# Patient Record
Sex: MALE | Race: WHITE | NOT HISPANIC OR LATINO | Employment: FULL TIME | URBAN - METROPOLITAN AREA
[De-identification: names, ages, dates, MRNs, and addresses within clinical notes are randomized per-mention and may not be internally consistent; named-entity substitution may affect disease eponyms.]

---

## 2018-12-22 ENCOUNTER — APPOINTMENT (OUTPATIENT)
Dept: GENERAL RADIOLOGY | Facility: CLINIC | Age: 43
End: 2018-12-22
Attending: INTERNAL MEDICINE
Payer: COMMERCIAL

## 2018-12-22 ENCOUNTER — APPOINTMENT (OUTPATIENT)
Dept: CT IMAGING | Facility: CLINIC | Age: 43
End: 2018-12-22
Attending: INTERNAL MEDICINE
Payer: COMMERCIAL

## 2018-12-22 ENCOUNTER — HOSPITAL ENCOUNTER (EMERGENCY)
Facility: CLINIC | Age: 43
Discharge: HOME | End: 2018-12-22
Attending: INTERNAL MEDICINE | Admitting: INTERNAL MEDICINE
Payer: COMMERCIAL

## 2018-12-22 VITALS
RESPIRATION RATE: 16 BRPM | TEMPERATURE: 97.8 F | DIASTOLIC BLOOD PRESSURE: 91 MMHG | BODY MASS INDEX: 39.2 KG/M2 | SYSTOLIC BLOOD PRESSURE: 132 MMHG | WEIGHT: 280 LBS | OXYGEN SATURATION: 95 % | HEART RATE: 77 BPM | HEIGHT: 71 IN

## 2018-12-22 DIAGNOSIS — R00.2 PALPITATIONS: ICD-10-CM

## 2018-12-22 DIAGNOSIS — R07.9 CHEST PAIN, UNSPECIFIED TYPE: ICD-10-CM

## 2018-12-22 LAB
ANION GAP SERPL CALCULATED.3IONS-SCNC: 7 MMOL/L (ref 3–14)
BASOPHILS # BLD AUTO: 0.1 10E9/L (ref 0–0.2)
BASOPHILS NFR BLD AUTO: 0.8 %
BUN SERPL-MCNC: 15 MG/DL (ref 7–30)
CALCIUM SERPL-MCNC: 8.6 MG/DL (ref 8.5–10.1)
CHLORIDE SERPL-SCNC: 101 MMOL/L (ref 94–109)
CO2 SERPL-SCNC: 27 MMOL/L (ref 20–32)
CREAT SERPL-MCNC: 0.68 MG/DL (ref 0.66–1.25)
D DIMER PPP FEU-MCNC: 0.3 UG/ML FEU (ref 0–0.5)
DIFFERENTIAL METHOD BLD: NORMAL
EOSINOPHIL # BLD AUTO: 0.3 10E9/L (ref 0–0.7)
EOSINOPHIL NFR BLD AUTO: 3.9 %
ERYTHROCYTE [DISTWIDTH] IN BLOOD BY AUTOMATED COUNT: 11.9 % (ref 10–15)
GFR SERPL CREATININE-BSD FRML MDRD: >90 ML/MIN/{1.73_M2}
GLUCOSE SERPL-MCNC: 240 MG/DL (ref 70–99)
HCT VFR BLD AUTO: 48.4 % (ref 40–53)
HGB BLD-MCNC: 17.1 G/DL (ref 13.3–17.7)
IMM GRANULOCYTES # BLD: 0 10E9/L (ref 0–0.4)
IMM GRANULOCYTES NFR BLD: 0.2 %
LYMPHOCYTES # BLD AUTO: 1.4 10E9/L (ref 0.8–5.3)
LYMPHOCYTES NFR BLD AUTO: 21.9 %
MCH RBC QN AUTO: 31.8 PG (ref 26.5–33)
MCHC RBC AUTO-ENTMCNC: 35.3 G/DL (ref 31.5–36.5)
MCV RBC AUTO: 90 FL (ref 78–100)
MONOCYTES # BLD AUTO: 0.5 10E9/L (ref 0–1.3)
MONOCYTES NFR BLD AUTO: 7.1 %
NEUTROPHILS # BLD AUTO: 4.2 10E9/L (ref 1.6–8.3)
NEUTROPHILS NFR BLD AUTO: 66.1 %
NRBC # BLD AUTO: 0 10*3/UL
NRBC BLD AUTO-RTO: 0 /100
PLATELET # BLD AUTO: 255 10E9/L (ref 150–450)
POTASSIUM SERPL-SCNC: 4.2 MMOL/L (ref 3.4–5.3)
RBC # BLD AUTO: 5.38 10E12/L (ref 4.4–5.9)
SODIUM SERPL-SCNC: 135 MMOL/L (ref 133–144)
TROPONIN I SERPL-MCNC: <0.015 UG/L (ref 0–0.04)
TSH SERPL DL<=0.005 MIU/L-ACNC: 2.36 MU/L (ref 0.4–4)
WBC # BLD AUTO: 6.4 10E9/L (ref 4–11)

## 2018-12-22 PROCEDURE — 25000128 H RX IP 250 OP 636: Performed by: INTERNAL MEDICINE

## 2018-12-22 PROCEDURE — 99285 EMERGENCY DEPT VISIT HI MDM: CPT | Mod: 25

## 2018-12-22 PROCEDURE — 71260 CT THORAX DX C+: CPT

## 2018-12-22 PROCEDURE — 93005 ELECTROCARDIOGRAM TRACING: CPT

## 2018-12-22 PROCEDURE — 71046 X-RAY EXAM CHEST 2 VIEWS: CPT

## 2018-12-22 PROCEDURE — 84484 ASSAY OF TROPONIN QUANT: CPT | Performed by: INTERNAL MEDICINE

## 2018-12-22 PROCEDURE — 85025 COMPLETE CBC W/AUTO DIFF WBC: CPT | Performed by: INTERNAL MEDICINE

## 2018-12-22 PROCEDURE — 84443 ASSAY THYROID STIM HORMONE: CPT | Performed by: INTERNAL MEDICINE

## 2018-12-22 PROCEDURE — 80048 BASIC METABOLIC PNL TOTAL CA: CPT | Performed by: INTERNAL MEDICINE

## 2018-12-22 PROCEDURE — 85379 FIBRIN DEGRADATION QUANT: CPT | Performed by: INTERNAL MEDICINE

## 2018-12-22 RX ORDER — OMEPRAZOLE 10 MG/1
20 CAPSULE, DELAYED RELEASE ORAL DAILY
COMMUNITY

## 2018-12-22 RX ORDER — IOPAMIDOL 755 MG/ML
500 INJECTION, SOLUTION INTRAVASCULAR ONCE
Status: COMPLETED | OUTPATIENT
Start: 2018-12-22 | End: 2018-12-22

## 2018-12-22 RX ORDER — LISINOPRIL 10 MG/1
10 TABLET ORAL DAILY
COMMUNITY

## 2018-12-22 RX ADMIN — SODIUM CHLORIDE 90 ML: 9 INJECTION, SOLUTION INTRAVENOUS at 18:31

## 2018-12-22 RX ADMIN — IOPAMIDOL 83 ML: 755 INJECTION, SOLUTION INTRAVENOUS at 18:31

## 2018-12-22 ASSESSMENT — ENCOUNTER SYMPTOMS
ABDOMINAL PAIN: 0
DYSURIA: 0
FREQUENCY: 0
PALPITATIONS: 1
COUGH: 1
DIZZINESS: 1
DIARRHEA: 0
VOMITING: 0
NAUSEA: 0
HEMATURIA: 0

## 2018-12-22 ASSESSMENT — MIFFLIN-ST. JEOR: SCORE: 2177.2

## 2018-12-22 NOTE — ED PROVIDER NOTES
History     Chief Complaint:  Chest pain     HPI   Rajesh Macario is a 43 year old male, with a history of diabetes and HTN, who presents with wife to the emergency department for evaluation of center chest pain. The patient reports he has been experiencing on and off chest pain for the past couple of weeks but worsened this morning lasting for a couple hours. He notes some palpitations of a racing heart rate on Monday for about 10-15 minutes. He notes he was coughing last night, due to his lisinopril, and noticed some groin pain. He reports some dizziness this morning which has since resolved. He denies any current groin pain or bulge, abdominal pain, nausea, vomiting, diarrhea, dysuria, frequency, or hematuria. He denies an recent immobilization. He denies a personal history of blood clots and denies a family history of blood clots.    Allergies:  No known drug allergies     Medications:    Excedrin migraine  Lotrisone  Lisinopril  Metformin  Prilosec    Past Medical History:    GERD  Type 2 diabetes    Past Surgical History:    History reviewed. No pertinent surgical history.    Family History:    History reviewed. No pertinent family history.     Social History:  The patient presents to the emergency department with his wife.  Marital Status:       Review of Systems   Respiratory: Positive for cough.    Cardiovascular: Positive for chest pain and palpitations.   Gastrointestinal: Negative for abdominal pain, diarrhea, nausea and vomiting.   Genitourinary: Negative for dysuria, frequency and hematuria.   Neurological: Positive for dizziness.   All other systems reviewed and are negative.    Physical Exam   Patient Vitals for the past 24 hrs:   BP Temp Temp src Pulse Heart Rate Resp SpO2 Height Weight   12/22/18 1745 128/60 -- -- 65 71 -- 96 % -- --   12/22/18 1730 127/84 -- -- 70 70 -- 94 % -- --   12/22/18 1715 125/86 -- -- 72 70 -- 96 % -- --   12/22/18 1700 -- -- -- -- 68 -- 93 % -- --  "  12/22/18 1630 -- -- -- -- 75 -- 96 % -- --   12/22/18 1615 -- -- -- 80 79 -- 95 % -- --   12/22/18 1600 -- -- -- 66 76 -- 96 % -- --   12/22/18 1545 -- -- -- 77 79 -- 95 % -- --   12/22/18 1537 (!) 134/102 97.8  F (36.6  C) Oral 80 80 20 96 % 1.803 m (5' 11\") 127 kg (280 lb)     Physical Exam   Constitutional:   Pleasant and cooperative   HENT:   Mouth/Throat: Oropharynx is clear and moist and mucous membranes are normal. No posterior oropharyngeal erythema.   Eyes: Conjunctivae are normal.   Neck: Neck supple.   Cardiovascular: Normal rate, regular rhythm and normal heart sounds.   Pulmonary/Chest: Effort normal and breath sounds normal.   Abdominal: Soft. Bowel sounds are normal. He exhibits no distension. There is no tenderness. There is no rebound and no guarding. Hernia confirmed negative in the right inguinal area.   Musculoskeletal: Normal range of motion.   Neurological: He is alert.   Skin: Skin is warm and dry.   Psychiatric: He has a normal mood and affect.     Emergency Department Course   ECG (15:37:33):  Rate 79 bpm. RI interval 134. QRS duration 94. QT/QTc 366/419. P-R-T axes 12 76 30. Normal sinus rhythm. Normal ECG.  Interpreted at 1615 by Amna Abdul MD.    Imaging:  Radiographic findings were communicated with the patient and family who voiced understanding of the findings.    Chest XR, PA & LAT  IMPRESSION: Left base atelectasis and/or infiltrate.  As read by Radiology.    Chest CT, IV contrast only - PE protocol  IMPRESSION:   1. No pulmonary embolism demonstrated.  2. No thoracic aortic dissection or aneurysm.   3. Large hiatal hernia containing essentially the entire stomach and a  large amount of intra-abdominal fat.  4. Probable compressive atelectasis secondary to the hiatal hernia in  the left lower lobe is less likely infiltrate.  As read by Radiology.    Laboratory:  CBC: AWNL (WBC 6.4, HGB 17.1, )  BMP: Glucose 240 (H) o/w WNL (Creatinine 0.68)    D dimer: " 0.3  Troponin I (1543): <0.015  TSH: 2.36    Emergency Department Course:  Past medical records, nursing notes, and vitals reviewed.  1604: I performed an exam of the patient and obtained history, as documented above.     IV inserted and blood drawn.    The patient was sent for a chest x-ray and a chest CT while in the emergency department, findings above.     1910: I rechecked the patient. Findings and plan explained to the Patient and spouse. Patient discharged home with instructions regarding supportive care, medications, and reasons to return. The importance of close follow-up was reviewed.   Impression & Plan    Medical Decision Making:    Rajesh Macario is a 43 year old male who presents the emergency department with chest pain and palpitations.  As noted his symptoms have been intermittent for weeks but then he had 2 hours of symptoms early this morning.  With prolonged symptoms over 8 hours ago I think a negative troponin rules out cardiac ischemia.  He has also had intermittent palpitations.  He has not had any arrhythmia here.  No significant electrolyte derangement.  I would like him to be on an event monitor to see if he has an intermittent arrhythmia.  I noted on exam he was sweaty and he indicates temperature intolerance.  TSH is normal.  On chest x-ray there was noted to be an abnormality at the left base.  This did not fit clinically with pneumonia so we pursued it with CT.  He does have a large hiatal hernia.  This may be related to his chest pain.    He also mentions groin pain.  I do not detect an inguinal hernia on exam.  Discussed signs of hernia and if he has any definite bulge or increased pain he needs to recheck.    I have discharged the patient with instructions to follow-up closely with primary care, return if worse or new symptoms.    Diagnosis:    ICD-10-CM    1. Chest pain, unspecified type R07.9    2. Palpitations R00.2 Cardiac Event Monitor Adult Pediatric      Disposition:  Discharged to home with instructions for follow up.  Ruth Martin  12/22/2018   [facility] EMERGENCY DEPARTMENT  Scribe Disclosure:  I, Ruth Martin, am serving as a scribe at 4:04 PM on 12/22/2018 to document services personally performed by Amna Abdul MD based on my observations and the provider's statements to me.      Amna Abdul MD  12/22/18 0750

## 2018-12-22 NOTE — ED TRIAGE NOTES
"Intermittent \"heart racing\" and \"some chest pain\" over last couple days. Today tightness in center of chest that radiates in left shoulder. Family hx of heart attacks. Pt has type II DM and hypertension- on lisinopril and metformin for about 6 months. Took 4 aspirin PTA. Presented to [facility] who sent him here with normal EKG. Pt says \"if I try to just relax then it seems to go away\" and wife says pt complains of some dizziness with the chest tightness.     Also complaint of pain in right side of groin when coughing.   ABCs intact. A&Ox3. VSS  "

## 2018-12-22 NOTE — ED AVS SNAPSHOT
[facility] Emergency Department  [address]  [facility] [address]  Phone:  [phone]  Fax:  [phone]                                    Rajesh Macario   MRN: [id number]    Department:  [facility] Emergency Department   Date of Visit:  12/22/2018           After Visit Summary Signature Page    I have received my discharge instructions, and my questions have been answered. I have discussed any challenges I see with this plan with the nurse or doctor.    ..........................................................................................................................................  Patient/Patient Representative Signature      ..........................................................................................................................................  Patient Representative Print Name and Relationship to Patient    ..................................................               ................................................  Date                                   Time    ..........................................................................................................................................  Reviewed by Signature/Title    ...................................................              ..............................................  Date                                               Time          22EPIC Rev 08/18

## 2018-12-23 LAB — INTERPRETATION ECG - MUSE: NORMAL

## 2018-12-23 NOTE — DISCHARGE INSTRUCTIONS
Discharge Instructions  Chest Pain    You have been seen today for chest pain or discomfort.  At this time, your doctor has found no signs that your chest pain is due to a serious or life-threatening condition, (or you have declined more testing and/or admission to the hospital). However, sometimes there is a serious problem that does not show up right away. Your evaluation today may not be complete and you may need further testing and evaluation.     You need to follow-up with your regular doctor within 3 days.    Return to the Emergency Department if:  Your chest pain changes, gets worse, starts to happen more often, or comes with less activity.  You are short of breath.  You get very weak or tired.  You pass out or faint.  You have any new symptoms, like fever, cough, numb legs, or you cough up blood.  You have anything else that worries you.    Until you follow-up with your regular doctor please do the following:  Take one aspirin daily unless you have an allergy or are told not to by your doctor.  If a stress test appointment has been made, go to the appointment.  If you have questions, contact your regular doctor.    If your doctor today has told you to follow-up with your regular doctor, it is very important that you make an appointment with your clinic and go to the appointment.  If you do not follow-up with your primary doctor, it may result in missing an important development which could result in permanent injury or disability and/or lasting pain.  If there is any problem keeping your appointment, call your doctor or return to the Emergency Department.    If you were given a prescription for medicine here today, be sure to read all of the information (including the package insert) that comes with your prescription.  This will include important information about the medicine, its side effects, and any warnings that you need to know about.  The pharmacist who fills the prescription can provide more  information and answer questions you may have about the medicine.  If you have questions or concerns that the pharmacist cannot address, please call or return to the Emergency Department.     Opioid Medication Information    Pain medications are among the most commonly prescribed medicines, so we are including this information for all our patients. If you did not receive pain medication or get a prescription for pain medicine, you can ignore it.     You may have been given a prescription for an opioid (narcotic) pain medicine and/or have received a pain medicine while here in the Emergency Department. These medicines can make you drowsy or impaired. You must not drive, operate dangerous equipment, or engage in any other dangerous activities while taking these medications. If you drive while taking these medications, you could be arrested for DUI, or driving under the influence. Do not drink any alcohol while you are taking these medications.     Opioid pain medications can cause addiction. If you have a history of chemical dependency of any type, you are at a higher risk of becoming addicted to pain medications.  Only take these prescribed medications to treat your pain when all other options have been tried. Take it for as short a time and as few doses as possible. Store your pain pills in a secure place, as they are frequently stolen and provide a dangerous opportunity for children or visitors in your house to start abusing these powerful medications. We will not replace any lost or stolen medicine.  As soon as your pain is better, you should flush all your remaining medication.     Many prescription pain medications contain Tylenol  (acetaminophen), including Vicodin , Tylenol #3 , Norco , Lortab , and Percocet .  You should not take any extra pills of Tylenol  if you are using these prescription medications or you can get very sick.  Do not ever take more than 3000 mg of acetaminophen in any 24 hour  period.    All opioids tend to cause constipation. Drink plenty of water and eat foods that have a lot of fiber, such as fruits, vegetables, prune juice, apple juice and high fiber cereal.  Take a laxative if you don?t move your bowels at least every other day. Miralax , Milk of Magnesia, Colace , or Senna  can be used to keep you regular.      Remember that you can always come back to the Emergency Department if you are not able to see your regular doctor in the amount of time listed above, if you get any new symptoms, or if there is anything that worries you.       Discharge Instructions  Palpitations    Palpitations are an unusual awareness of your heartbeat. People often describe this as the heart skipping, fluttering, racing, irregular, or pounding. At this time, your doctor has found no signs that your palpitations are due to a serious or life-threatening condition. However, sometimes there is a serious problem that does not show up right away. It is important that you follow up with your doctor within 1 week, or as directed by your doctor today, to check for other serious problems. You may need more blood tests, a stress test, heart monitoring, or other tests.    Palpitations can be caused by caffeine, cigarettes, diet pills, energy drinks or supplements, other stimulants, and medications and street drugs. They can also be caused by anxiety, hormone conditions such as high thyroid, and other medical conditions. Sometimes they are a sign of abnormal rhythm in the heart, so you may need your heart checked.    Return to the Emergency Department if:  You get chest pain or tightness.  You are short of breath.  You get very weak or tired.  You pass out or faint.  Your heart rate is over 120 beats per minute for more than 10 minutes while you are resting.  You have any new symptoms, like fever, cough, numb legs, or you cough up blood.  You have anything else that worries you.    What can I do to help myself?  Fill  any prescriptions the doctor gave you and take them right away.   Follow your doctor?s instructions about the prescription medicines you are on. Sometimes the doctor may tell you to stop taking a medicine or change the dose.  If you smoke, this may be a good time to quit! The less you can smoke, the better.  Do not use energy drinks, diet pills, or stimulants. Limit your use of caffeine.    Follow up with your doctor:  Within 1 week, or sooner if instructed.  If you keep having palpitations.  If you need help to quit smoking.  If you were given a prescription for medicine here today, be sure to read all of the information (including the package insert) that comes with your prescription.  This will include important information about the medicine, its side effects, and any warnings that you need to know about.  The pharmacist who fills the prescription can provide more information and answer questions you may have about the medicine.  If you have questions or concerns that the pharmacist cannot address, please call or return to the Emergency Department.         Opioid Medication Information    Pain medications are among the most commonly prescribed medicines, so we are including this information for all our patients. If you did not receive pain medication or get a prescription for pain medicine, you can ignore it.     You may have been given a prescription for an opioid (narcotic) pain medicine and/or have received a pain medicine while here in the Emergency Department. These medicines can make you drowsy or impaired. You must not drive, operate dangerous equipment, or engage in any other dangerous activities while taking these medications. If you drive while taking these medications, you could be arrested for DUI, or driving under the influence. Do not drink any alcohol while you are taking these medications.     Opioid pain medications can cause addiction. If you have a history of chemical dependency of any type,  you are at a higher risk of becoming addicted to pain medications.  Only take these prescribed medications to treat your pain when all other options have been tried. Take it for as short a time and as few doses as possible. Store your pain pills in a secure place, as they are frequently stolen and provide a dangerous opportunity for children or visitors in your house to start abusing these powerful medications. We will not replace any lost or stolen medicine.  As soon as your pain is better, you should flush all your remaining medication.     Many prescription pain medications contain Tylenol  (acetaminophen), including Vicodin , Tylenol #3 , Norco , Lortab , and Percocet .  You should not take any extra pills of Tylenol  if you are using these prescription medications or you can get very sick.  Do not ever take more than 3000 mg of acetaminophen in any 24 hour period.    All opioids tend to cause constipation. Drink plenty of water and eat foods that have a lot of fiber, such as fruits, vegetables, prune juice, apple juice and high fiber cereal.  Take a laxative if you don?t move your bowels at least every other day. Miralax , Milk of Magnesia, Colace , or Senna  can be used to keep you regular.      Remember that you can always come back to the Emergency Department if you are not able to see your regular doctor in the amount of time listed above, if you get any new symptoms, or if there is anything that worries you.

## 2018-12-23 NOTE — ED NOTES
Pt and wife verbalize understanding of discharge plan and follow up for holter monitor. Pt discharged with wife ambulating well

## 2019-01-08 ENCOUNTER — HOSPITAL ENCOUNTER (OUTPATIENT)
Dept: CARDIOLOGY | Facility: CLINIC | Age: 44
Discharge: HOME | End: 2019-01-08
Attending: INTERNAL MEDICINE | Admitting: INTERNAL MEDICINE
Payer: COMMERCIAL

## 2019-01-08 DIAGNOSIS — R00.2 PALPITATIONS: ICD-10-CM

## 2019-01-08 PROCEDURE — 93270 REMOTE 30 DAY ECG REV/REPORT: CPT

## 2019-01-08 PROCEDURE — 93272 ECG/REVIEW INTERPRET ONLY: CPT | Performed by: INTERNAL MEDICINE

## 2023-11-27 ENCOUNTER — HOSPITAL ENCOUNTER (EMERGENCY)
Facility: CLINIC | Age: 48
Discharge: HOME | End: 2023-11-27
Attending: EMERGENCY MEDICINE | Admitting: EMERGENCY MEDICINE
Payer: COMMERCIAL

## 2023-11-27 VITALS
RESPIRATION RATE: 20 BRPM | HEART RATE: 87 BPM | SYSTOLIC BLOOD PRESSURE: 149 MMHG | TEMPERATURE: 96 F | DIASTOLIC BLOOD PRESSURE: 99 MMHG | OXYGEN SATURATION: 97 %

## 2023-11-27 DIAGNOSIS — S61.411A LACERATION OF PALM WITHOUT COMPLICATION, RIGHT, INITIAL ENCOUNTER: ICD-10-CM

## 2023-11-27 PROCEDURE — 12001 RPR S/N/AX/GEN/TRNK 2.5CM/<: CPT

## 2023-11-27 PROCEDURE — 99282 EMERGENCY DEPT VISIT SF MDM: CPT

## 2023-11-27 RX ORDER — LIDOCAINE HYDROCHLORIDE 10 MG/ML
INJECTION, SOLUTION EPIDURAL; INFILTRATION; INTRACAUDAL; PERINEURAL
Status: DISCONTINUED
Start: 2023-11-27 | End: 2023-11-27 | Stop reason: HOSPADM

## 2023-11-27 ASSESSMENT — ACTIVITIES OF DAILY LIVING (ADL): ADLS_ACUITY_SCORE: 33

## 2023-11-27 NOTE — ED PROVIDER NOTES
History   Chief Complaint:  Laceration     The history is provided by the patient.      Rajesh Macario is a 48 year old male with last tetanus in 2022 presenting today for a laceration to the right thumb that occurred this morning. Patient reports he lacerated his thumb with a knife while attempting to remove tape from his right index finger.  Denies any loss of sensation or strength.     Independent Historian:   None - Patient Only    Review of External Notes:   Reviewed [facility] -- last tdap 2/4/22.     Medications:    Lisinopril  Metformin  Omeprazole    Past Medical History:    Anxiety  Hyperlipidemia  Hypertension  Cardiomegaly  Diabetes  GERD    Physical Exam   Patient Vitals for the past 24 hrs:   BP Temp Temp src Pulse Resp SpO2   11/27/23 0658 (!) 149/99 (!) 96  F (35.6  C) Temporal 87 20 97 %        Physical Exam  BP (!) 149/99   Pulse 87   Temp (!) 96  F (35.6  C) (Temporal)   Resp 20   SpO2 97%    General: Sitting in chair.  Examined in FT04.  Head: Atraumatic. Normocephalic.  CV: Regular rate.  Capillary refill <3 seconds of right thumb.  Respiratory: Breathing comfortably on room air.   Msk: Full range of motion of the right thumb including flexion, extension, abduction, adduction, and opposition.  Full flexion and extension of the distal phalanx.   Skin: Warm and dry. 1 cm laceration on the palmar aspect of right thumb over the MCP joint.  Neuro: Awake, alert, and conversant. Strength and sensation intact.     Emergency Department Course   Procedures     Laceration Repair      Procedure: Laceration Repair    Indication: Laceration    Consent: Verbal    Location: Right R first (thumb) finger    Length: 1 cm    Preparation: Irrigation with Sterile Saline.    Anesthesia/Sedation: Bupivacaine - 0.5%      Treatment/Exploration: Wound explored, no foreign bodies found     Closure: The wound was closed with one layer. Skin/superficial layer was closed with 2 x 5-0 Nylon using Interrupted sutures.      Patient Status: The patient tolerated the procedure well: Yes. There were no complications.    Emergency Department Course & Assessments:  Interventions:  Medications   lidocaine (PF) (XYLOCAINE) 1 % injection (has no administration in time range)        Assessments and Consultations:  Patient was seen in conjunction with attending physician Dr. Sigala.    0715   I performed my initial evaluation of the patient  ED Course as of 11/27/23 0749   Mon Nov 27, 2023   0730 Laceration repaired as above.       Independent Interpretation (X-rays, CTs, rhythm strip):  None    Social Determinants of Health affecting care:   None    Disposition:  The patient was discharged to home.     Impression & Plan    Medical Decision Making:  This is a 48 year old male presenting today with a laceration as above.  Imaging was not indicated.   Wound was anesthetized, irrigated, and repaired as above.  Tetanus was up to date.  Given mechanism and location of injury, antibiotics are not indicated.  Plan will be for discharge to home with close outpatient follow up.  They will have the sutures removed in 7-10 days.  They should return for signs of infection such as fevers, streaking of redness up the arm, or foul-smelling drainage.  I discussed the plan with the patient. All questions were answered and they were in agreement the plan.     Diagnosis:    ICD-10-CM    1. Laceration of palm without complication, right, initial encounter  S61.411A          Scribe Disclosure:  I, Stephanie Dickson, am serving as a scribe at 7:17 AM on 11/27/2023 to document services personally performed by Sidra Wilde PA-C, based on my observations and the provider's statements to me.     Sidra Wilde PA-C  November 27, 2023   [facility]           Sidra Wilde PA-C  11/27/23 0749

## 2023-11-27 NOTE — ED NOTES
All patient questions have been answered, no further questions at this time. Discharge paperwork was gone over with patient. Patient verbalizes understanding of discharge teaching, medications, wound care, when to return and the importance of follow up.  Patient verbalizes feeling safe returning home at this time.

## 2023-11-27 NOTE — ED NOTES
Emergency Department Attending Supervision Note        I evaluated this patient with Wilde PAC.       Briefly, the patient presented with right palm laceration to the webspace between the first and second digits.  Bleeding is controlled.  Tetanus is up-to-date.  He has a normal neurovascular exam including radian, ulnar, and median motor and sensory function.  Laceration is repaired under my supervision with excellent approximation.  Bandage placed.  Plan removal in 7 to 10 days of stitches and return precautions for redness, pain, swelling, or any other concerns.      Review of external records: reviewed 11/13/23 office visit for finger laceration      Visit Diagnosis, Associated Orders, and Comments     ICD-10-CM    1. Laceration of palm without complication, right, initial encounter  S61.411A             Rajesh Sigala MD  [facility]  [facility] Emergency Department           Rajesh Sigala MD  11/27/23 0826